# Patient Record
Sex: MALE | Race: WHITE | Employment: FULL TIME | URBAN - METROPOLITAN AREA
[De-identification: names, ages, dates, MRNs, and addresses within clinical notes are randomized per-mention and may not be internally consistent; named-entity substitution may affect disease eponyms.]

---

## 2016-02-11 LAB
ALBUMIN SERPL-MCNC: 4.1 G/DL
ALP SERPL-CCNC: 62 U/L
ALT SERPL-CCNC: 69 U/L
ANION GAP SERPL CALCULATED.3IONS-SCNC: 10 MMOL/L
AST SERPL-CCNC: 32 U/L
BILIRUB SERPL-MCNC: 0.4 MG/DL
BUN SERPL-MCNC: 22 MG/DL
CALCIUM SERPL-MCNC: 9.6 MG/DL
CHLORIDE SERPLBLD-SCNC: 101 MMOL/L
CHOLEST SERPL-MCNC: 164 MG/DL
CO2 SERPL-SCNC: 27 MMOL/L
CREAT SERPL-MCNC: 0.99 MG/DL
GFR SERPL CREATININE-BSD FRML MDRD: >60 ML/MIN/1.73M2
GLUCOSE SERPL-MCNC: 122 MG/DL (ref 70–99)
HDLC SERPL-MCNC: 45 MG/DL
LDLC SERPL CALC-MCNC: 96 MG/DL
NONHDLC SERPL-MCNC: NORMAL MG/DL
POTASSIUM SERPL-SCNC: 4.6 MMOL/L
PROT SERPL-MCNC: 6.9 G/DL
SODIUM SERPL-SCNC: 138 MMOL/L
TRIGL SERPL-MCNC: 116 MG/DL

## 2016-09-23 LAB
ALBUMIN SERPL-MCNC: 4 G/DL
ALP SERPL-CCNC: 56 U/L
ALT SERPL-CCNC: 60 U/L
ANION GAP SERPL CALCULATED.3IONS-SCNC: 11 MMOL/L
AST SERPL-CCNC: 32 U/L
BILIRUB SERPL-MCNC: 0.5 MG/DL
BUN SERPL-MCNC: 19 MG/DL
CALCIUM SERPL-MCNC: 9.3 MG/DL
CHLORIDE SERPLBLD-SCNC: 104 MMOL/L
CHOLEST SERPL-MCNC: 138 MG/DL
CO2 SERPL-SCNC: 25 MMOL/L
CREAT SERPL-MCNC: 0.93 MG/DL
GFR SERPL CREATININE-BSD FRML MDRD: >60 ML/MIN/1.73M2
GLUCOSE SERPL-MCNC: 107 MG/DL (ref 70–99)
HBA1C MFR BLD: 7.1 % (ref 0–5.7)
HDLC SERPL-MCNC: 42 MG/DL
LDLC SERPL CALC-MCNC: 75 MG/DL
NONHDLC SERPL-MCNC: NORMAL MG/DL
POTASSIUM SERPL-SCNC: 4.4 MMOL/L
PROT SERPL-MCNC: 6.5 G/DL
SODIUM SERPL-SCNC: 140 MMOL/L
TRIGL SERPL-MCNC: 107 MG/DL

## 2017-01-06 ENCOUNTER — PRE VISIT (OUTPATIENT)
Dept: CARDIOLOGY | Facility: CLINIC | Age: 67
End: 2017-01-06

## 2017-01-12 ENCOUNTER — OFFICE VISIT (OUTPATIENT)
Dept: CARDIOLOGY | Facility: CLINIC | Age: 67
End: 2017-01-12
Attending: INTERNAL MEDICINE
Payer: COMMERCIAL

## 2017-01-12 VITALS
HEART RATE: 76 BPM | DIASTOLIC BLOOD PRESSURE: 80 MMHG | HEIGHT: 69 IN | WEIGHT: 246 LBS | SYSTOLIC BLOOD PRESSURE: 102 MMHG | BODY MASS INDEX: 36.43 KG/M2

## 2017-01-12 DIAGNOSIS — E78.5 HYPERLIPIDEMIA LDL GOAL <70: ICD-10-CM

## 2017-01-12 DIAGNOSIS — I10 ESSENTIAL HYPERTENSION: Primary | ICD-10-CM

## 2017-01-12 DIAGNOSIS — I25.10 CORONARY ARTERY DISEASE INVOLVING NATIVE CORONARY ARTERY OF NATIVE HEART WITHOUT ANGINA PECTORIS: ICD-10-CM

## 2017-01-12 DIAGNOSIS — E78.00 HIGH CHOLESTEROL: ICD-10-CM

## 2017-01-12 LAB
ALT SERPL W P-5'-P-CCNC: 35 U/L (ref 5–30)
ANION GAP SERPL CALCULATED.3IONS-SCNC: 13.2 MMOL/L (ref 6–17)
BUN SERPL-MCNC: 19 MG/DL (ref 7–30)
CALCIUM SERPL-MCNC: 9.3 MG/DL (ref 8.5–10.5)
CHLORIDE SERPL-SCNC: 100 MMOL/L (ref 98–107)
CHOLEST SERPL-MCNC: 126 MG/DL
CO2 SERPL-SCNC: 26 MMOL/L (ref 23–29)
CREAT SERPL-MCNC: 1.3 MG/DL (ref 0.7–1.3)
GFR SERPL CREATININE-BSD FRML MDRD: 56 ML/MIN/1.7M2
GLUCOSE SERPL-MCNC: 153 MG/DL (ref 70–105)
HDLC SERPL-MCNC: 32 MG/DL
LDLC SERPL CALC-MCNC: 64 MG/DL
NONHDLC SERPL-MCNC: 94 MG/DL
POTASSIUM SERPL-SCNC: 4.2 MMOL/L (ref 3.5–5.1)
SODIUM SERPL-SCNC: 135 MMOL/L (ref 136–145)
TRIGL SERPL-MCNC: 152 MG/DL

## 2017-01-12 PROCEDURE — 80061 LIPID PANEL: CPT | Performed by: INTERNAL MEDICINE

## 2017-01-12 PROCEDURE — 80048 BASIC METABOLIC PNL TOTAL CA: CPT | Performed by: INTERNAL MEDICINE

## 2017-01-12 PROCEDURE — 99214 OFFICE O/P EST MOD 30 MIN: CPT | Performed by: INTERNAL MEDICINE

## 2017-01-12 PROCEDURE — 36415 COLL VENOUS BLD VENIPUNCTURE: CPT | Performed by: INTERNAL MEDICINE

## 2017-01-12 PROCEDURE — 84460 ALANINE AMINO (ALT) (SGPT): CPT | Performed by: INTERNAL MEDICINE

## 2017-01-12 RX ORDER — CLOPIDOGREL BISULFATE 75 MG/1
75 TABLET ORAL DAILY
Qty: 90 TABLET | Refills: 3 | Status: SHIPPED | OUTPATIENT
Start: 2017-01-12

## 2017-01-12 RX ORDER — ISOSORBIDE MONONITRATE 60 MG/1
60 TABLET, EXTENDED RELEASE ORAL EVERY MORNING
Qty: 90 TABLET | Refills: 3 | Status: SHIPPED | OUTPATIENT
Start: 2017-01-12

## 2017-01-12 RX ORDER — AMLODIPINE BESYLATE 2.5 MG/1
2.5 TABLET ORAL DAILY
Qty: 90 TABLET | Refills: 3 | Status: SHIPPED | OUTPATIENT
Start: 2017-01-12

## 2017-01-12 RX ORDER — ATORVASTATIN CALCIUM 40 MG/1
40 TABLET, FILM COATED ORAL DAILY
Qty: 90 TABLET | Refills: 3 | Status: SHIPPED | OUTPATIENT
Start: 2017-01-12

## 2017-01-12 RX ORDER — LOSARTAN POTASSIUM 100 MG/1
100 TABLET ORAL DAILY
Qty: 90 TABLET | Refills: 3 | Status: SHIPPED | OUTPATIENT
Start: 2017-01-12

## 2017-01-12 RX ORDER — CARVEDILOL 25 MG/1
37.5 TABLET ORAL 2 TIMES DAILY WITH MEALS
Qty: 270 TABLET | Refills: 3 | Status: SHIPPED | OUTPATIENT
Start: 2017-01-12

## 2017-01-12 RX ORDER — NITROGLYCERIN 0.4 MG/1
0.4 TABLET SUBLINGUAL EVERY 5 MIN PRN
Qty: 25 TABLET | Refills: 3 | Status: SHIPPED | OUTPATIENT
Start: 2017-01-12

## 2017-01-12 NOTE — MR AVS SNAPSHOT
After Visit Summary   1/12/2017    David Hanson    MRN: [id number]           Patient Information     Date Of Birth          1950        Visit Information        Provider Department      1/12/2017 11:15 AM Kayden Gaytan MD [facility]        Today's Diagnoses     Essential hypertension    -  1     Coronary artery disease involving native coronary artery of native heart without angina pectoris         Hyperlipidemia LDL goal <70         High cholesterol            Follow-ups after your visit        Additional Services     Follow-Up with Cardiologist                 Future tests that were ordered for you today     Open Future Orders        Priority Expected Expires Ordered    US Aorta/Ivc/Iliac Duplex Complete Routine 7/19/2017 1/12/2018 1/12/2017    Basic metabolic panel Routine 7/11/2017 1/12/2018 1/12/2017    Lipid Profile Routine 7/11/2017 1/12/2018 1/12/2017    ALT Routine 7/11/2017 1/12/2018 1/12/2017    Echocardiogram Routine 7/11/2017 1/12/2018 1/12/2017    NM Exercise stress test (nuc card) Routine 7/11/2017 1/12/2018 1/12/2017    Follow-Up with Cardiologist Routine 7/11/2017 1/12/2018 1/12/2017            Who to contact     If you have questions or need follow up information about today's clinic visit or your schedule please contact [facility] directly at [phone].  Normal or non-critical lab and imaging results will be communicated to you by MyChart, letter or phone within 4 business days after the clinic has received the results. If you do not hear from us within 7 days, please contact the clinic through MyChart or phone. If you have a critical or abnormal lab result, we will notify you by phone as soon as possible.  Submit refill requests through [employer] or call your pharmacy and they will forward the refill request to us. Please allow 3 business days for your refill to be completed.     "      Additional Information About Your Visit        [employer] Information     [employer] lets you send messages to your doctor, view your test results, renew your prescriptions, schedule appointments and more. To sign up, go to [email or web address] . Click on \"Log in\" on the left side of the screen, which will take you to the Welcome page. Then click on \"Sign up Now\" on the right side of the page.     You will be asked to enter the access code listed below, as well as some personal information. Please follow the directions to create your username and password.     Your access code is: [email or web address]  Expires: 2017 12:35 PM     Your access code will  in 90 days. If you need help or a new code, please call your [facility] clinic or [phone].        Care EveryWhere ID     This is your Care EveryWhere ID. This could be used by other organizations to access your [facility] medical records  [id number]        Your Vitals Were     Pulse Height BMI (Body Mass Index)             76 1.753 m (5' 9\") 36.31 kg/m2          Blood Pressure from Last 3 Encounters:   17 102/80   12/08/15 124/80   11/06/15 178/90    Weight from Last 3 Encounters:   17 111.585 kg (246 lb)   12/08/15 109.317 kg (241 lb)   11/06/15 110.814 kg (244 lb 4.8 oz)              We Performed the Following     Follow-Up with Cardiologist          Where to get your medicines      These medications were sent to [facility] - [address] [address]  , [place] [address]     Phone:  [phone]    - amLODIPine 2.5 MG tablet  - atorvastatin 40 MG tablet  - carvedilol 25 MG tablet  - clopidogrel 75 MG tablet  - isosorbide mononitrate 60 MG 24 hr tablet  - losartan 100 MG tablet  - nitroglycerin 0.4 MG sublingual tablet       Primary Care Provider Office Phone # Fax #    Leandro Christensen [phone]       [address] [place] [address]      "   Thank you!     Thank you for choosing [facility] [place]  for your care. Our goal is always to provide you with excellent care. Hearing back from our patients is one way we can continue to improve our services. Please take a few minutes to complete the written survey that you may receive in the mail after your visit with us. Thank you!             Your Updated Medication List - Protect others around you: Learn how to safely use, store and throw away your medicines at www.disposemymeds.org.          This list is accurate as of: 1/12/17 12:35 PM.  Always use your most recent med list.                   Brand Name Dispense Instructions for use    amLODIPine 2.5 MG tablet    NORVASC    90 tablet    Take 1 tablet (2.5 mg) by mouth daily       aspirin 81 MG chewable tablet      Take 81 mg by mouth daily       atorvastatin 40 MG tablet    LIPITOR    90 tablet    Take 1 tablet (40 mg) by mouth daily       carvedilol 25 MG tablet    COREG    270 tablet    Take 1.5 tablets (37.5 mg) by mouth 2 times daily (with meals)       clopidogrel 75 MG tablet    PLAVIX    90 tablet    Take 1 tablet (75 mg) by mouth daily       isosorbide mononitrate 60 MG 24 hr tablet    IMDUR    90 tablet    Take 1 tablet (60 mg) by mouth every morning       losartan 100 MG tablet    COZAAR    90 tablet    Take 1 tablet (100 mg) by mouth daily       metFORMIN 500 MG tablet    GLUCOPHAGE     Take 500 mg by mouth 2 times daily (with meals)       nitroglycerin 0.4 MG sublingual tablet    NITROSTAT    25 tablet    Place 1 tablet (0.4 mg) under the tongue every 5 minutes as needed for chest pain       triamcinolone 0.1 % ointment    KENALOG     Apply topically daily as needed To feet

## 2017-01-12 NOTE — Clinical Note
1/12/2017    Leandro Christensen MD  [facility]  [address] [place] [address]    RE: David Hanson       Dear Colleague,    I had the pleasure of seeing David Hanson in the [facility].    The patient is a 66-year-old overweight white male with a strong family history of premature coronary artery disease, hypertension, dyslipidemia, prior tobacco use who presented to [facility] in late 2013 with an acute myocardial infarction.  He underwent cardiac catheterization and coronary angiography with placement of 3 stents in the right coronary artery.  He had been a previous cigarette smoker and discontinued 5 years prior to that time.  He was also noted to have diffuse 2-vessel coronary artery disease with mild irregularities and narrowing of the left main coronary artery to 30%, narrowing in the left anterior descending of 60%-70% as well as proximal midvessel narrowing in the area of the first septal , the first diagonal branch was large with 40%-50%, the left circumflex was large at 30%-35%, large obtuse marginal branch had 50%-60%.  The left circumflex developed a large terminal OM branch that had mild disease.  The right coronary artery was nondominant, which was occluded and did have the aggressive intervention.  Cardiolite stress testing previously had shown a medium-sized apical to basilar inferior/inferolateral defect consistent with nontransmural infarct.  Cardiolite stress testing performed in 2015 demonstrated a small reversible inferior/inferolateral defect at the base consistent with mild ischemia without infarct.  There was no other significant ischemia or infarct noted.  Gating demonstrated a normal-sized left ventricle with intact left ventricular function, ejection fraction of 50%-60%.  There was slightly more reversibility in the inferior/inferolateral defect suggestive of mild ischemia versus just a fixed scar.   He has not related significant symptoms of chest discomfort, shortness of breath, dizziness, palpitations, nausea, vomiting, diaphoresis or syncope.  He denies PND, orthopnea, fevers, chills or sweats.  He has a good appetite and sleeps reasonably well, although he has been diagnosed as having sleep apnea in the past.      MEDICATIONS:   1.  Metformin recently started for elevated blood sugars at 500 mg twice a day with food.   2.  Amlodipine 2.5 mg a day.   3.  Isosorbide mononitrate 60 mg a day.   4.  Atorvastatin 40 mg a day.   5.  Carvedilol 37.5 mg twice a day.   6.  Nitroglycerin 0.4 mg sublingual p.r.n., which has not been used.   7.  Clopidogrel 75 mg a day.   8.  Losartan 100 mg a day.   9.  Aspirin 81 mg a day.      LABORATORY DATA:  Demonstrated cholesterol 126, HDL 32, LDL 64, triglyceride 152, sodium 135, potassium 4.2, BUN 19, creatinine 1.3.  Recent hemoglobin A1c was 7.1.  ALT is 35.      The patient presents to Cardiology Clinic for followup of ischemic heart disease.      PHYSICAL EXAMINATION:   VITAL SIGNS:  Blood pressure 102/80 with heart rate of 70-80 and regular.  Weight was 246 pounds, which was up 2 pounds in weight.   NECK:  Neck was somewhat difficult to assess but was without significant jugular venous distention, carotid bruit or palpable thyroid.   CHEST:  Essentially clear to percussion and auscultation with slight decreased breath sounds at the bases.   CARDIAC:  Regular rhythm, soft S4 gallop, 1 to 2/6 systolic murmur at the left sternal border radiating toward the apex.  No diastolic murmur, rub or S3.   EXTREMITIES:  Without cyanosis or edema.      CLINICAL IMPRESSION:   1.  Stable cardiac condition.   2.  History of ischemic heart disease, status post inferior wall myocardial infarction with PTCA and 3 drug-eluting stents placed in the right coronary artery in late 2013.   3.  History of multivessel coronary artery disease with diffuse atherosclerosis.   4.  History of  hypertension -- improved control.   5.  History of hyperlipidemia -- at goal though with decreased HDL and elevated triglycerides consistent with metabolic syndrome.   6.  History of sleep apnea.   7.  Overweight.   8.  Type 2 diabetes mellitus, non-insulin dependent.      DISCUSSION:  The patient has done well from a cardiac viewpoint.  He has had no significant symptoms of angina pectoris or congestive heart failure.  There is still a plan to move more routinely to [facility] for followup of his medications as well as his blood tests and cardiac function.  He would like to have the next assessment of his heart this summer when he goes on Medicare, which would include an echocardiogram to check left ventricular function, a Cardiolite stress test on medication to check ischemic status of the myocardium and medical efficacy and an ultrasound of the aorta to rule out abdominal aortic aneurysm in a previous cigarette smoker.  His eventual followup after that may be up in the [place] area.  Overall, otherwise, he appears to be doing well and is stable.  He will continue to try to lose weight.      RECOMMENDATIONS:   1.  Continue present medications.   2.  Close followup of serum lipids, basic metabolic panel and blood pressure.   3.  Echocardiogram in 6 months to follow left ventricular function.   4.  Cardiolite stress test in 6 months on medication to check ischemic status of the myocardium and medical efficacy.   5.  Abdominal ultrasound to rule out abdominal aortic aneurysm in 6 months.   6.  Sleep apnea treatment.   7.  Routine medical followup.   8.  Cardiology followup either in 6 months or with Dr. Jon Abdullahi in [place], [place].     Again, thank you for allowing me to participate in the care of your patient.      Sincerely,    Kayden Gaytan MD     [facility]    cc:   Jon Abdullahi MD   [place], [place]

## 2017-01-13 NOTE — PROGRESS NOTES
HISTORY OF PRESENT ILLNESS:  The patient is a 66-year-old overweight white male with a strong family history of premature coronary artery disease, hypertension, dyslipidemia, prior tobacco use who presented to [facility] [place] in late 2013 with an acute myocardial infarction.  He underwent cardiac catheterization and coronary angiography with placement of 3 stents in the right coronary artery.  He had been a previous cigarette smoker and discontinued 5 years prior to that time.  He was also noted to have diffuse 2-vessel coronary artery disease with mild irregularities and narrowing of the left main coronary artery to 30%, narrowing in the left anterior descending of 60%-70% as well as proximal midvessel narrowing in the area of the first septal , the first diagonal branch was large with 40%-50%, the left circumflex was large at 30%-35%, large obtuse marginal branch had 50%-60%.  The left circumflex developed a large terminal OM branch that had mild disease.  The right coronary artery was nondominant, which was occluded and did have the aggressive intervention.  Cardiolite stress testing previously had shown a medium-sized apical to basilar inferior/inferolateral defect consistent with nontransmural infarct.  Cardiolite stress testing performed in 2015 demonstrated a small reversible inferior/inferolateral defect at the base consistent with mild ischemia without infarct.  There was no other significant ischemia or infarct noted.  Gating demonstrated a normal-sized left ventricle with intact left ventricular function, ejection fraction of 50%-60%.  There was slightly more reversibility in the inferior/inferolateral defect suggestive of mild ischemia versus just a fixed scar.  He has not related significant symptoms of chest discomfort, shortness of breath, dizziness, palpitations, nausea, vomiting, diaphoresis or syncope.  He denies PND, orthopnea, fevers, chills or sweats.  He has a good  appetite and sleeps reasonably well, although he has been diagnosed as having sleep apnea in the past.      MEDICATIONS:   1.  Metformin recently started for elevated blood sugars at 500 mg twice a day with food.   2.  Amlodipine 2.5 mg a day.   3.  Isosorbide mononitrate 60 mg a day.   4.  Atorvastatin 40 mg a day.   5.  Carvedilol 37.5 mg twice a day.   6.  Nitroglycerin 0.4 mg sublingual p.r.n., which has not been used.   7.  Clopidogrel 75 mg a day.   8.  Losartan 100 mg a day.   9.  Aspirin 81 mg a day.      LABORATORY DATA:  Demonstrated cholesterol 126, HDL 32, LDL 64, triglyceride 152, sodium 135, potassium 4.2, BUN 19, creatinine 1.3.  Recent hemoglobin A1c was 7.1.  ALT is 35.      The patient presents to Cardiology Clinic for followup of ischemic heart disease.      PHYSICAL EXAMINATION:   VITAL SIGNS:  Blood pressure 102/80 with heart rate of 70-80 and regular.  Weight was 246 pounds, which was up 2 pounds in weight.   NECK:  Neck was somewhat difficult to assess but was without significant jugular venous distention, carotid bruit or palpable thyroid.   CHEST:  Essentially clear to percussion and auscultation with slight decreased breath sounds at the bases.   CARDIAC:  Regular rhythm, soft S4 gallop, 1 to 2/6 systolic murmur at the left sternal border radiating toward the apex.  No diastolic murmur, rub or S3.   EXTREMITIES:  Without cyanosis or edema.      CLINICAL IMPRESSION:   1.  Stable cardiac condition.   2.  History of ischemic heart disease, status post inferior wall myocardial infarction with PTCA and 3 drug-eluting stents placed in the right coronary artery in late 2013.   3.  History of multivessel coronary artery disease with diffuse atherosclerosis.   4.  History of hypertension -- improved control.   5.  History of hyperlipidemia -- at goal though with decreased HDL and elevated triglycerides consistent with metabolic syndrome.   6.  History of sleep apnea.   7.  Overweight.   8.  Type 2  diabetes mellitus, non-insulin dependent.      DISCUSSION:  The patient has done well from a cardiac viewpoint.  He has had no significant symptoms of angina pectoris or congestive heart failure.  There is still a plan to move more routinely to [facility] for followup of his medications as well as his blood tests and cardiac function.  He would like to have the next assessment of his heart this summer when he goes on Medicare, which would include an echocardiogram to check left ventricular function, a Cardiolite stress test on medication to check ischemic status of the myocardium and medical efficacy and an ultrasound of the aorta to rule out abdominal aortic aneurysm in a previous cigarette smoker.  His eventual followup after that may be up in the [place] area.  Overall, otherwise, he appears to be doing well and is stable.  He will continue to try to lose weight.      RECOMMENDATIONS:   1.  Continue present medications.   2.  Close followup of serum lipids, basic metabolic panel and blood pressure.   3.  Echocardiogram in 6 months to follow left ventricular function.   4.  Cardiolite stress test in 6 months on medication to check ischemic status of the myocardium and medical efficacy.   5.  Abdominal ultrasound to rule out abdominal aortic aneurysm in 6 months.   6.  Sleep apnea treatment.   7.  Routine medical followup.   8.  Cardiology followup either in 6 months or with Dr. Jon Abdullahi in [place], [place].      cc:   Leandro Christensen MD    [address] [place] [facility], [address]    [place]  [address]       Jon Abdullahi MD   [place], [place]         SAMI MEADOWS MD, [facility]             D: 2017 12:45   T: 2017 20:54   MT: ZI      Name:     GEOVANNA ALCANTAR   MRN:      [id number]        Account:      [id number]   :      1950           Service Date: 2017      Document: [id number]

## 2017-05-15 ENCOUNTER — RECORDS - HEALTHEAST (OUTPATIENT)
Dept: LAB | Facility: CLINIC | Age: 67
End: 2017-05-15

## 2017-05-15 LAB
CHOLEST SERPL-MCNC: 121 MG/DL
FASTING STATUS PATIENT QL REPORTED: YES
HDLC SERPL-MCNC: 29 MG/DL
LDLC SERPL CALC-MCNC: 66 MG/DL
TRIGL SERPL-MCNC: 131 MG/DL

## 2017-05-16 LAB — HBA1C MFR BLD: 7.4 % (ref 4.2–6.1)

## 2017-07-21 ENCOUNTER — DOCUMENTATION ONLY (OUTPATIENT)
Dept: CARDIOLOGY | Facility: CLINIC | Age: 67
End: 2017-07-21

## 2017-07-26 ENCOUNTER — TELEPHONE (OUTPATIENT)
Dept: CARDIOLOGY | Facility: CLINIC | Age: 67
End: 2017-07-26

## 2017-07-26 NOTE — TELEPHONE ENCOUNTER
Patient's wife called, left message that patient is transferring care to Dr. Abdullahi. Call transferred to Medical Records.